# Patient Record
Sex: MALE | Race: WHITE | NOT HISPANIC OR LATINO | Employment: OTHER | URBAN - METROPOLITAN AREA
[De-identification: names, ages, dates, MRNs, and addresses within clinical notes are randomized per-mention and may not be internally consistent; named-entity substitution may affect disease eponyms.]

---

## 2024-10-07 ENCOUNTER — OFFICE VISIT (OUTPATIENT)
Dept: UROLOGY | Facility: CLINIC | Age: 39
End: 2024-10-07
Payer: OTHER GOVERNMENT

## 2024-10-07 VITALS
WEIGHT: 191.81 LBS | BODY MASS INDEX: 30.1 KG/M2 | HEIGHT: 67 IN | DIASTOLIC BLOOD PRESSURE: 79 MMHG | SYSTOLIC BLOOD PRESSURE: 130 MMHG

## 2024-10-07 DIAGNOSIS — Z30.09 VISIT FOR VASECTOMY EVALUATION: Primary | ICD-10-CM

## 2024-10-07 PROCEDURE — 99204 OFFICE O/P NEW MOD 45 MIN: CPT | Mod: S$PBB,,, | Performed by: UROLOGY

## 2024-10-07 PROCEDURE — 99203 OFFICE O/P NEW LOW 30 MIN: CPT | Mod: PBBFAC,PO | Performed by: UROLOGY

## 2024-10-07 PROCEDURE — 99999 PR PBB SHADOW E&M-NEW PATIENT-LVL III: CPT | Mod: PBBFAC,,, | Performed by: UROLOGY

## 2024-10-07 RX ORDER — CEFAZOLIN SODIUM 2 G/50ML
2 SOLUTION INTRAVENOUS
OUTPATIENT
Start: 2024-10-07

## 2024-10-07 NOTE — PROGRESS NOTES
Procedure Order to Urology [[id number]]    Electronically signed by: Abelardo Temple MD on 10/07/24 1424 Status: Active   Ordering user: Abelardo Temple MD 10/07/24 1424 Authorized by: Abelardo Temple MD   Ordering mode: Standard   Frequency:  10/07/24 -     Diagnoses  Visit for vasectomy evaluation [Z30.09]   Questionnaire    Question Answer   Procedure Vasectomy Comment - 10/23 main or   Facility Name: [place]   Out patient [facility], Please order IV, Ancef 2 gram ( alternative for PCN allergy is Cipro 400mg IV ) General Anesthesia.

## 2024-10-07 NOTE — PROGRESS NOTES
"[facility] New Patient/H&P:     Patrick Hendricks is a 39 y.o. male who presents for evaluation for vasectomy     Together with  wife, age 38, for 1 year.   1 child together, age 1 years old. He has a 6 year old from prior relationship  No sig PMHx  No surgeries before     No LUTS, hematuria, dysuria  No fam Hx  malignancies     No past medical history on file. GERD    No past surgical history on file.    No family history on file.    Social History     Socioeconomic History    Marital status:        Review of patient's allergies indicates:  No Known Allergies    Medications Reviewed: see MAR    Focused Physical Exam    Vitals:    10/07/24 1322   BP: 130/79     Body mass index is 30.04 kg/m². Weight: 87 kg (191 lb 12.8 oz) Height: 5' 7" (170.2 cm)       Abdomen: Soft, non-tender, nondistended, no CVA tenderness  :  circ normal phallus without plaques/lesions, orthotopic urethral meatus, bilaterally desc testes in normal scrotum without mass or lesion  - bilaterally palpable vasa      Assessment/Diagnosis:    1. Visit for vasectomy evaluation  Procedure Order to Urology          Plans:  Long discussion with patient regarding vasectomy including procedure in detail and risks benefits alternatives, and he is interested in proceeding.   We discussed procedure in detail, risks and beneifts, and expectations for post-vasectomy including time frame of approximately 6-9 weeks on average, but up to 3-4 months to achieve a negative semenalysis before being cleared to discontinue other contraceptive methods. I did note if 2 semenalyses (6 wks and 8 wks) are negative he will be cleared to do so. Risks including but not limited to including pain, infection, bleeding hematoma, sperm granuloma, epididymitis, and recanalization were discussed, including discussion about maneuvers performed to eliminate risk of recanalization (excise segment, suture ligate and cauterize both ends, and bury one end at different fascial " level in scrotum).  Vasectomy scheduled 10/23/24 per pt preference. Consent obatined.              Level of Medical Decision Making  [ _ ]  Low: 2 minor/1 stable chronic/1 acute uncomplicated --- review record/result/order test x2  [X ]  Mod: 1 chronic exac/2stable chronic/1 acute comp --- review record/result/order test x3 OR independent interp of outside test OR discuss mgmt of outside ordered test --- start drug therapy/plan minor surgery   [ _ ]  High: chronic with exacerbation/progression or trtmnt side effect vs acute/chronic w/ life/body threat ---  (2/3) review record/result/order test x3 OR independent interp of ouutside test OR discuss mgmt of outside ordered test --- drug with monitoring for toxicity, plan major surgery, hospitalize, deescalate/withdraw care bc of prognosis

## 2024-10-21 ENCOUNTER — TELEPHONE (OUTPATIENT)
Dept: UROLOGY | Facility: CLINIC | Age: 39
End: 2024-10-21
Payer: OTHER GOVERNMENT

## 2024-10-21 NOTE — TELEPHONE ENCOUNTER
----- Message from Davno sent at 10/21/2024 11:43 AM CDT -----  Type: Needs Medical Advice    Who Called:  Pt    Best Call Back Number: [phone]    Additional Information: Pt needs to cancel procedure and reschedule at a later date. Please call back to advise, Thanks!

## 2024-10-21 NOTE — TELEPHONE ENCOUNTER
Spoke w pt voiced that he would like to cancel vasectomy for now and will call back after gets work schedule.at a later date